# Patient Record
Sex: MALE | HISPANIC OR LATINO | Employment: UNEMPLOYED | URBAN - METROPOLITAN AREA
[De-identification: names, ages, dates, MRNs, and addresses within clinical notes are randomized per-mention and may not be internally consistent; named-entity substitution may affect disease eponyms.]

---

## 2022-03-04 ENCOUNTER — OFFICE VISIT (OUTPATIENT)
Dept: URGENT CARE | Facility: CLINIC | Age: 37
End: 2022-03-04
Payer: MEDICAID

## 2022-03-04 VITALS
HEIGHT: 65 IN | DIASTOLIC BLOOD PRESSURE: 72 MMHG | RESPIRATION RATE: 18 BRPM | HEART RATE: 99 BPM | WEIGHT: 225.6 LBS | OXYGEN SATURATION: 95 % | BODY MASS INDEX: 37.59 KG/M2 | TEMPERATURE: 97 F | SYSTOLIC BLOOD PRESSURE: 120 MMHG

## 2022-03-04 DIAGNOSIS — R22.0 RIGHT FACIAL SWELLING: ICD-10-CM

## 2022-03-04 DIAGNOSIS — K04.7 DENTAL INFECTION: ICD-10-CM

## 2022-03-04 PROCEDURE — 99204 OFFICE O/P NEW MOD 45 MIN: CPT | Performed by: PHYSICIAN ASSISTANT

## 2022-03-04 RX ORDER — IBUPROFEN 200 MG
200 TABLET ORAL EVERY 6 HOURS PRN
COMMUNITY

## 2022-03-04 RX ORDER — KETOROLAC TROMETHAMINE 30 MG/ML
30 INJECTION, SOLUTION INTRAMUSCULAR; INTRAVENOUS ONCE
Status: COMPLETED | OUTPATIENT
Start: 2022-03-04 | End: 2022-03-04

## 2022-03-04 RX ORDER — AMOXICILLIN AND CLAVULANATE POTASSIUM 875; 125 MG/1; MG/1
1 TABLET, FILM COATED ORAL 2 TIMES DAILY
Qty: 14 TABLET | Refills: 0 | Status: SHIPPED | OUTPATIENT
Start: 2022-03-04 | End: 2022-03-11

## 2022-03-04 RX ADMIN — KETOROLAC TROMETHAMINE 30 MG: 30 INJECTION, SOLUTION INTRAMUSCULAR; INTRAVENOUS at 10:41

## 2022-03-04 ASSESSMENT — ENCOUNTER SYMPTOMS
VOMITING: 0
MYALGIAS: 0
FEVER: 0
SINUS PAIN: 0
HEADACHES: 0
SORE THROAT: 0
NAUSEA: 0
CHILLS: 0
NECK PAIN: 0
DIZZINESS: 0
ABDOMINAL PAIN: 0

## 2022-03-04 NOTE — PROGRESS NOTES
Subjective:   Johnathan Griffin is a 36 y.o. male who presents for Oral Swelling (Pt has tooth pain, face swelling on (R) side x yesterday )      HPI:  This is a very pleasant 36-year-old male presenting to the clinic with dental pain and right-sided facial swelling x24 hours.  Patient has a history of poor dentition and multiple dental fractures.  States he has pain to his right upper teeth.  He shortly later developed right-sided facial pain and swelling.  He has not noticed any discharge or foul taste in the mouth.  No visualization of an abscess.  He has not been running a fever.  Denies any nausea or vomiting.  No preceding URI.  No pain with eye movements or visual change.  He took ibuprofen approximately 3 hours ago which provided mild relief.    Review of Systems   Constitutional: Negative for chills, fever and malaise/fatigue.   HENT: Negative for congestion, ear pain, sinus pain and sore throat.         Right upper molar dental pain.  Right-sided facial swelling.  History of dental fractures and caries.   Gastrointestinal: Negative for abdominal pain, nausea and vomiting.   Musculoskeletal: Negative for myalgias and neck pain.   Skin: Negative for rash.   Neurological: Negative for dizziness and headaches.       Medications:    • amoxicillin-clavulanate Tabs  • ceftriaxone (ROCEPHIN) 1g - lidocaine 1% 3.6 mL for IM use  • ibuprofen Tabs  • ketorolac    Allergies: Patient has no known allergies.    Problem List: Johnathan Griffin does not have a problem list on file.    Surgical History:  No past surgical history on file.    Past Social Hx: Johnathan Griffin  reports that he has quit smoking. He has never used smokeless tobacco. He reports current alcohol use. He reports that he does not use drugs.     Past Family Hx:  Johnathan Griffin family history is not on file.     Problem list, medications, and allergies reviewed by myself today in Epic.     Objective:     /72  "(BP Location: Right arm, Patient Position: Sitting, BP Cuff Size: Large adult)   Pulse 99   Temp 36.1 °C (97 °F) (Temporal)   Resp 18   Ht 1.65 m (5' 4.96\")   Wt 102 kg (225 lb 9.6 oz)   SpO2 95%   BMI 37.59 kg/m²     Physical Exam  Constitutional:       General: He is not in acute distress.     Appearance: Normal appearance. He is not toxic-appearing or diaphoretic.   HENT:      Head: Normocephalic and atraumatic.      Jaw: No pain on movement.      Salivary Glands: Right salivary gland is not diffusely enlarged or tender.        Comments: Right-sided facial swelling.  No tenderness over the parotid gland.  Tenderness lateral to the right nostril.     Right Ear: Tympanic membrane, ear canal and external ear normal. There is no impacted cerumen.      Left Ear: Tympanic membrane, ear canal and external ear normal. There is no impacted cerumen.      Nose: Nose normal. No congestion or rhinorrhea.      Mouth/Throat:      Comments: Poor dentition.  Right upper molar fracture.  No visualization of abscess.  No discharge or drainage present.  No discharge or drainage to Baron's or Stensen's ducts.  Eyes:      Extraocular Movements: Extraocular movements intact.      Conjunctiva/sclera: Conjunctivae normal.      Pupils: Pupils are equal, round, and reactive to light.      Comments: EOMs full intact and pain-free.  PERRLA.  No periorbital swelling or tenderness.   Cardiovascular:      Rate and Rhythm: Normal rate and regular rhythm.      Pulses: Normal pulses.      Heart sounds: Normal heart sounds.   Pulmonary:      Effort: Pulmonary effort is normal.      Breath sounds: Normal breath sounds.   Musculoskeletal:      Cervical back: Normal range of motion.   Neurological:      General: No focal deficit present.      Mental Status: He is alert and oriented to person, place, and time. Mental status is at baseline.           Assessment/Plan:     Comments/MDM:     • Patient has signs consistent with a dental infection.  " There is no visualization of an abscess on exam.  No tenderness over the parotid gland.  No discharge or drainage from Baron's or Stensen's ducts.  Believe he likely has an ascending infection through his right upper molar.  Has had progressively worsening facial swelling and pain over the last 24 hours.  We will treat aggressively at this time with Rocephin in clinic as well as outpatient Augmentin.  He is going to call and schedule with dentistry.  Strict ER precautions for any worsening symptoms/pain is further imaging/tooth extraction/I&D may be necessary if the patient fails antibiotic treatment.     Diagnosis and associated orders:     1. Dental infection  cefTRIAXone (Rocephin) 1 g, lidocaine (XYLOCAINE) 1 % 3.6 mL for IM use    ketorolac (TORADOL) injection 30 mg    amoxicillin-clavulanate (AUGMENTIN) 875-125 MG Tab   2. Right facial swelling  cefTRIAXone (Rocephin) 1 g, lidocaine (XYLOCAINE) 1 % 3.6 mL for IM use    ketorolac (TORADOL) injection 30 mg    amoxicillin-clavulanate (AUGMENTIN) 875-125 MG Tab            Differential diagnosis, natural history, supportive care, and indications for immediate follow-up discussed.    I personally reviewed prior external notes and test results pertinent to today's visit.     Advised the patient to follow-up with the primary care physician for recheck, reevaluation, and consideration of further management.    Please note that this dictation was created using voice recognition software. I have made reasonable attempt to correct obvious errors, but I expect that there are errors of grammar and possibly content that I did not discover before finalizing the note.    This note was electronically signed by HILARIO Larios PA-C